# Patient Record
Sex: MALE | Race: WHITE | NOT HISPANIC OR LATINO | Employment: FULL TIME | URBAN - METROPOLITAN AREA
[De-identification: names, ages, dates, MRNs, and addresses within clinical notes are randomized per-mention and may not be internally consistent; named-entity substitution may affect disease eponyms.]

---

## 2019-10-29 ENCOUNTER — OFFICE VISIT (OUTPATIENT)
Dept: SLEEP MEDICINE | Facility: CLINIC | Age: 24
End: 2019-10-29

## 2019-10-29 VITALS
BODY MASS INDEX: 40.6 KG/M2 | SYSTOLIC BLOOD PRESSURE: 148 MMHG | OXYGEN SATURATION: 97 % | HEIGHT: 66 IN | HEART RATE: 80 BPM | DIASTOLIC BLOOD PRESSURE: 88 MMHG | WEIGHT: 252.6 LBS

## 2019-10-29 DIAGNOSIS — R06.83 SNORING: ICD-10-CM

## 2019-10-29 DIAGNOSIS — G47.30 OBSERVED SLEEP APNEA: Primary | ICD-10-CM

## 2019-10-29 DIAGNOSIS — F51.01 PRIMARY INSOMNIA: ICD-10-CM

## 2019-10-29 DIAGNOSIS — R56.9 SEIZURE (HCC): ICD-10-CM

## 2019-10-29 DIAGNOSIS — E66.01 CLASS 3 SEVERE OBESITY DUE TO EXCESS CALORIES WITHOUT SERIOUS COMORBIDITY WITH BODY MASS INDEX (BMI) OF 40.0 TO 44.9 IN ADULT (HCC): ICD-10-CM

## 2019-10-29 DIAGNOSIS — G47.10 HYPERSOMNIA: ICD-10-CM

## 2019-10-29 PROBLEM — E66.813 CLASS 3 SEVERE OBESITY DUE TO EXCESS CALORIES WITHOUT SERIOUS COMORBIDITY WITH BODY MASS INDEX (BMI) OF 40.0 TO 44.9 IN ADULT: Status: ACTIVE | Noted: 2019-10-29

## 2019-10-29 PROCEDURE — 99204 OFFICE O/P NEW MOD 45 MIN: CPT | Performed by: INTERNAL MEDICINE

## 2019-10-29 PROCEDURE — G0463 HOSPITAL OUTPT CLINIC VISIT: HCPCS

## 2019-10-29 RX ORDER — LEVETIRACETAM 500 MG/1
500 TABLET ORAL 2 TIMES DAILY
COMMUNITY

## 2019-10-29 RX ORDER — ZOLPIDEM TARTRATE 5 MG/1
5 TABLET ORAL TAKE AS DIRECTED
Qty: 2 TABLET | Refills: 0 | Status: SHIPPED | OUTPATIENT
Start: 2019-10-29

## 2019-10-29 RX ORDER — FEXOFENADINE HCL 180 MG/1
180 TABLET ORAL DAILY
COMMUNITY

## 2019-10-29 NOTE — PROGRESS NOTES
[facility]  [address], [place] [address]  Phone   Fax       Hakan Rankin  1995  24 y.o.  male      PCP:Alvin Salgado MD    Type of service: Initial New Patient Office Visit  Date of service: 10/29/2019    Chief Complaint   Patient presents with   • Fatigue   • Unable To Stay Asleep   • Witnessed Apnea   • Snoring   • Daytime Sleepiness   • Seizures       History of present illness;  Hakan Rankin is a new patient for me and was seen today for sleep related problems.  Patient has self-referred to the sleep center because his wife has told him that he he has significant amount snoring and he stops breathing.  He also is tired and has daytime excessive sleepiness.  He works as a  at [employer].  He says he is very pena very irritable.  He also gives history of having seizures for a long time and takes medications.  Sometimes when he wakes up he has difficult time going back to sleep.    Patient gives the following sleep history.  Sleep schedule:  Bedtime: Between 8 10:10 PM  Wake time: Between 6 and 7 AM  Normally takes about 20 minutes to fall asleep  Average hours of sleep 6-7  Number of naps per day 1  When patient wakes up still feels tired and not rested  Snoring yes  Witnessed apneas yes  Have you ever awakened gasping for breath, coughing, choking: Yes      Past Medical History:   Diagnosis Date   • Obesity    • Seasonal allergies    • Seizures (CMS/[facility])        Social history:  Shift work: No  Tobacco use: No  Alcohol use: Maybe once in 3 months  Caffeinated drinks: 1-2 coffee  Over-the-counter sleeping aid and medications: No  Narcotic medications: No    Family Hx  Family history of sleep apnea no  Family History   Problem Relation Age of Onset   • Seizures Sister        Medications: reviewed    Current Outpatient Medications:   •  fexofenadine (ALLEGRA) 180 MG tablet, Take 180 mg by mouth Daily., Disp: , Rfl:   •   "levETIRAcetam (KEPPRA) 500 MG tablet, Take 500 mg by mouth 2 (Two) Times a Day., Disp: , Rfl:   •  zolpidem (AMBIEN) 5 MG tablet, Take 1 tablet by mouth Take As Directed for 2 doses. Bring the medication to the sleep lab. DO NOT USE AT HOME, Disp: 2 tablet, Rfl: 0    Review of systems:  [place] Sleepiness Scale: Total score: 13   Positive for snoring, witnessed apneas, fatigue and daytime excessive sleepiness,   Negative for shortness of breath, cough, wheezing, chest pain, nausea and vomiting, palpitation, swelling of feet:    Morning headaches: Yes sometimes  Awaken with sore throat or dry mouth : Yes  Leg jerking at night: No  Crawly feeling/urge sensation to move in the legs: No  Teeth grinding: No  Sleepwalking, nightmares, muscle weakness with laughing or anger,sleep paralysis: No  Nasal Congestion: No  Nocturia (how many times/night): 1  Memory Problem: No  Weight change, no    Physical exam:  Vitals:    10/29/19 1300   BP: 148/88   Pulse: 80   SpO2: 97%   Weight: 115 kg (252 lb 9.6 oz)   Height: 167.6 cm (66\")    Body mass index is 40.77 kg/m². Neck Circumference: 17.5 inches  HEENT: Head is atraumatic, normocephalic  Eyes: pupils are round equal and reacting to light and accommodation, conjunctiva normal  Nose: no nasal septal defects or deviation and the nasal passages are clear, no nasal polyps,  Throat: tonsils are not enlarged, tongue normal size, oral airway Mallampati class 3  NECK: No lymphadenopathy, trachea is in the midline, thyroid not enlarged  RESPIRATORY SYSTEM: Breath sounds are equal on both sides, there are no wheezes or crackles  CARDIOVASULAR SYSTEM: Heart sounds are regular rhythm and mai rate, there are no murmurs or thrills  ABDOMEN: Soft, no hepatosplenomegaly, no evidence of ascites  EXTREMITES: No cyanosis, clubbing or edema   NEUROLOGICAL SYSTEM: Oriented x 3, no gross motor defects, gait normal      Assessment and plan:  · Sleep apnea unspecified, (G47.30) Patient's symptoms and " examination is consistent with sleep apnea.  I have talked to the patient about the signs and symptoms of sleep apnea and consequences of untreated sleep apnea. Discussed the sleep testing. I have placed a order in epic for a in lab Split night sleep test.  Patient also gives a history of insomnia have given him a prescription for zolpidem to be used for the sleep test.  In addition patient has a history of seizures which requires an lab monitoring.  · Obesity, patient's BMI is Body mass index is 40.77 kg/m².. I have discussed the relationship between weight and sleep apnea. Weight reduction is encouraged.  I will also discussed with the patient diet and exercise.  · Snoring secondary to sleep apnea  · Hypersomnia with [place] Sleepiness Scale of Total score: 13 due to sleep apnea.  · History of seizures who is on Keppra  · Insomnia, I have talked to the patient about sleep hygiene and maintaining a regular sleep and wake cycle.        Teagan Foster MD, [facility]  Sleep Medicine.(Board-certified)  [facility]  10/29/2019 ,

## 2019-11-03 ENCOUNTER — HOSPITAL ENCOUNTER (OUTPATIENT)
Dept: SLEEP MEDICINE | Facility: HOSPITAL | Age: 24
Discharge: HOME | End: 2019-11-03
Admitting: INTERNAL MEDICINE

## 2019-11-03 VITALS — HEIGHT: 66 IN | BODY MASS INDEX: 40.75 KG/M2 | WEIGHT: 253.53 LBS

## 2019-11-03 DIAGNOSIS — G47.30 OBSERVED SLEEP APNEA: ICD-10-CM

## 2019-11-03 DIAGNOSIS — F51.01 PRIMARY INSOMNIA: ICD-10-CM

## 2019-11-03 DIAGNOSIS — R56.9 SEIZURE (HCC): ICD-10-CM

## 2019-11-03 DIAGNOSIS — R06.83 SNORING: ICD-10-CM

## 2019-11-03 DIAGNOSIS — G47.10 HYPERSOMNIA: ICD-10-CM

## 2019-11-03 DIAGNOSIS — E66.01 CLASS 3 SEVERE OBESITY DUE TO EXCESS CALORIES WITHOUT SERIOUS COMORBIDITY WITH BODY MASS INDEX (BMI) OF 40.0 TO 44.9 IN ADULT (HCC): ICD-10-CM

## 2019-11-03 PROCEDURE — 95811 POLYSOM 6/>YRS CPAP 4/> PARM: CPT

## 2019-11-03 PROCEDURE — 95811 POLYSOM 6/>YRS CPAP 4/> PARM: CPT | Performed by: INTERNAL MEDICINE

## 2019-11-05 DIAGNOSIS — R06.83 SNORING: ICD-10-CM

## 2019-11-05 DIAGNOSIS — G47.33 OSA (OBSTRUCTIVE SLEEP APNEA): Primary | ICD-10-CM

## 2019-11-05 DIAGNOSIS — R56.9 SEIZURE (HCC): ICD-10-CM

## 2019-11-05 DIAGNOSIS — G47.10 HYPERSOMNIA: ICD-10-CM

## 2020-01-21 ENCOUNTER — OFFICE VISIT (OUTPATIENT)
Dept: SLEEP MEDICINE | Facility: CLINIC | Age: 25
End: 2020-01-21

## 2020-01-21 VITALS
DIASTOLIC BLOOD PRESSURE: 82 MMHG | OXYGEN SATURATION: 95 % | WEIGHT: 255.6 LBS | SYSTOLIC BLOOD PRESSURE: 149 MMHG | HEART RATE: 88 BPM | HEIGHT: 66 IN | BODY MASS INDEX: 41.08 KG/M2

## 2020-01-21 DIAGNOSIS — G47.33 OSA TREATED WITH BIPAP: Primary | ICD-10-CM

## 2020-01-21 DIAGNOSIS — E66.01 CLASS 3 SEVERE OBESITY DUE TO EXCESS CALORIES WITHOUT SERIOUS COMORBIDITY WITH BODY MASS INDEX (BMI) OF 40.0 TO 44.9 IN ADULT (HCC): ICD-10-CM

## 2020-01-21 PROBLEM — G47.10 HYPERSOMNIA: Status: RESOLVED | Noted: 2019-10-29 | Resolved: 2020-01-21

## 2020-01-21 PROBLEM — R06.83 SNORING: Status: RESOLVED | Noted: 2019-10-29 | Resolved: 2020-01-21

## 2020-01-21 PROCEDURE — 99213 OFFICE O/P EST LOW 20 MIN: CPT | Performed by: INTERNAL MEDICINE

## 2020-01-21 PROCEDURE — G0463 HOSPITAL OUTPT CLINIC VISIT: HCPCS

## 2020-01-21 NOTE — PROGRESS NOTES
[facility]  [address], [place] [address]  Phone   Fax       SLEEP CLINIC FOLLOW UP PROGRESS NOTE.    Hakan Rankin  1995  24 y.o.  male      PCP: Alvin Salgado MD      Date of visit: 1/21/2020    Chief Complaint   Patient presents with   • Sleep Apnea   • Follow-up       INTERM HISTORY:  This is a 24 y.o. years old patient who has a history of sleep apnea and is on BIPAP.  Patient reports good compliance with the device.  Patient is using the BiPAP on a regular basis and feels much better.  He works nights and he sleeps in the daytime.  He complains of dry mouth    Sleep schedule  Normally goes to bed at 9 AM  Wakes up at 5 p.m.  Feels refreshed after waking up: Yes      The Smart card downloaded on 1/21/2020 has been reviewed by me and shows the following..  Compliance; 98 %  > 4 hr use, 70 %  Average use of the device 4 hours and 40 minutes per night  Residual AHI: 0.9 /hr (normal less than 5)  Mask type: Full facemask  DME: [employer]      Past Medical History:   Diagnosis Date   • Obesity    • Seasonal allergies    • Seizures (CMS/HCC)        MEDICATIONS: reviewed by me    Current Outpatient Medications:   •  fexofenadine (ALLEGRA) 180 MG tablet, Take 180 mg by mouth Daily., Disp: , Rfl:   •  levETIRAcetam (KEPPRA) 500 MG tablet, Take 500 mg by mouth 2 (Two) Times a Day., Disp: , Rfl:   •  zolpidem (AMBIEN) 5 MG tablet, Take 1 tablet by mouth Take As Directed for 2 doses. Bring the medication to the sleep lab. DO NOT USE AT HOME, Disp: 2 tablet, Rfl: 0    No Known Allergies reviewed by me    SOCIAL, FAMILY HISTORY: Medical records are reviewed and noted by me.    REVIEW OF SYSTEMS:   [place] Sleepiness Scale :Total score: 3   Snoring: Resolved  Morning headache: No  Nasal congestion: No  Leg movements: No  Number of times you wake up once asleep: None  Heart burn no    PHYSICAL EXAMINATION:  Vitals:    01/21/20 1106   BP: 149/82   Pulse: 88   SpO2:  "95%   Weight: 116 kg (255 lb 9.6 oz)   Height: 167.6 cm (66\")    Body mass index is 41.25 kg/m².    HEENT: pupils are round equal and reacting to light and accommodation, nasal passage is clear, no nasal polyps, no lymphadenopathy, throat is clear, oral airway Mallampati class 3  RESPRATORY SYSTEM: Breath sounds are equal on both sides and are normal, no wheezes or crackles  CARDIOVASULAR SYSTEM: Heart rate is regular without murmur  ABDOMEN: Soft, no ascites, no hepatosplenomegaly.  EXTREMITIES: No cyanosis, clubbing or edema       ASSESSMENT AND PLAN:  · Obstructive sleep apnea, patient is using the BIPAP with good compliance for treatment of sleep apnea.  I have reviewed the smart card down load and discussed with the patient the download data and encouarged the patient to continue to use the BIPAP.  His symptoms have improved and his sleep apnea has improved.  The residual AHI is acceptable.  The device is benefiting the patient and the device is medically necessary. The patient is also instructed to get the BiPAP supplies from the [employer] and see me in 1 year for follow-up.  The prescription for supplies has been sent to the DME company.    · Obesity, patient's BMI is Body mass index is 41.25 kg/m²..  I have discussed weight reduction and the health benefits.  I have also discuss the relationship between the weight and sleep apnea and encouraged the patient to lose weight which is beneficial in treating sleep apnea. Discussed diet and exercise with the patient.  · Shiftwork syndrome          Teagan Foster MD, [facility]  Sleep Medicine.(Board-certified)  [facility]  Medical Director for [facility]   1/21/2020               "

## 2021-09-14 ENCOUNTER — TELEPHONE (OUTPATIENT)
Dept: SLEEP MEDICINE | Facility: CLINIC | Age: 26
End: 2021-09-14

## 2021-09-14 NOTE — TELEPHONE ENCOUNTER
"Patient called in regards to the [employer] Recall. I advised him to call [employer] [phone] to get registered for a replacement. I confirmed that he is not using an ozone . I told him \"for reasons of medical necessity, we cannot advise to discontinue the use of your machine\". Patient verbally understood.  "

## 2024-08-05 NOTE — PROGRESS NOTES
Subjective: History of Seizure Disorder and Obstructive Sleep Apnea    Patient ID: Hakan Rankin is a 29 y.o. male.    CHIEF COMPLAINT:Seizure and STUART treated with BiPAP    History of Present Illness  New patient referred by Dr. Salgado for seizure disorder and STUART treated with BiPAP.     Mr. Rankin is a 29-year-old  male who presented alone for an evaluation of epilepsy and to be followed for BiPAP with STUART.  The patient states that he started having seizures in .  He reports they are [place] arm and leg movement tonic-clonic movements.  He has been followed for several years by Dr. Martinez and has been on Keppra 500 twice daily with no breakthrough seizures.  He states he has not had any seizures for at least 2 years.  The patient states that he should not be on Keppra because it causes anger issues.  I discussed this thoroughly with him and we had decided on putting him on a different medication Briviact. I notified him that we will have to do a titration increasing the Briviact to the right dose before we can start decreasing the Keppra.  He agreed with this transition.  I notified the patient to be established with our office we needed to get an MRI of the brain and do a sleep deprived EEG.      Sleep Apnea   The patient has had testing at [facility] and was followed by Luis Ley MD  Last office note states that the patient had an abnormal test with a baseline AHI of 25/h and then he was set up and titrated to BiPAP on a [employer] air care machine.  At his last visit he was about 80% compliant.  STUART device compliance data  DME Company: [employer]  PAP mode BiPAP  PAP settin/10  Mask type: Nasal pillow  STUART greater than 4-hour use 80%  STUART nightly use 5  Residual AHI 0.3  [place] sleepiness score was an 8  *The patient forgot his smart card today and will drop it by next week so that we can get a download and contact him and make an addendum to this note.        Seizure disorder  Onset of  seizures:  2006  Triggers: not sure  Last: couple years  Duration:  not sure  Frequency:  rare  Timing (awake/asleep/both):  awake  Features at onset:  no warning  Unresponsiveness (none/partial/complete):  complete  Loss of awareness (none/partial/complete):  complete  Eyes (closed/staring/rolled back/left/right): roll back  head deviation (straight/left/right):  not that he knows of  Automatisms (none/lip smacking/fumbling):  not that he knows of  Stiffening/posturing (none/gen/left/right/upper/lower):  none  jerking (none/gen/left/right/upper/lower/asynchronous/myoclonic):  whole body  Tongue biting:  yes  Speech (retained/arrested):  none  Incontinence (none/bowel/bladder):  not that he knows  Postictal duration/symptoms:  groggy, confused  Injuries:  none  Status epilepticus (never/ER visit/admission/intubation:  no   Current seizure medications: Keppra 500 mg twice daily      EPWORTH SLEEPINESS SCALE  Sitting and reading  JS [place] Sleepiness: 3   WatchingTV  JS [place] Sleepiness: 3  and 1  Sitting, inactive, in a public place  JS [place] Sleepiness: 1  As a passenger in a car for 1 hour w/o a break  JS [place] Sleepiness: 1  Lying down to rest in the afternoon  JS [place] Sleepiness: 1  Sitting and talking to someone  JS [place] Sleepiness: 0  Sitting quietly after a lunch  JS [place] Sleepiness: 1  In a car, while stopped for traffic or a light  JS [place] Sleepiness: 0  Total 10         The following portions of the patient's history were reviewed and updated as appropriate: allergies, current medications, past family history, past medical history, past social history, past surgical history and problem list.      Family History   Problem Relation Age of Onset    Seizures Sister        Past Medical History:   Diagnosis Date    Obesity     Seasonal allergies     Seizures        Social History     Socioeconomic History    Marital status:    Tobacco Use    Smoking status: Never    Smokeless tobacco:  Never   Substance and Sexual Activity    Alcohol use: Yes    Drug use: No    Sexual activity: Defer         Current Outpatient Medications:     levETIRAcetam (KEPPRA) 500 MG tablet, Take 1 tablet by mouth 2 (Two) Times a Day., Disp: , Rfl:     triamterene-hydrochlorothiazide (MAXZIDE-25) 37.5-25 MG per tablet, Take 1 tablet by mouth Daily., Disp: , Rfl:     brivaracetam (BRIVIACT) 100 MG tablet, Take 1 tablet by mouth 2 (Two) Times a Day., Disp: 180 tablet, Rfl: 3    brivaracetam (BRIVIACT) 50 MG tablet, Take 1 tablet by mouth 2 (Two) Times a Day for 30 days., Disp: 60 tablet, Rfl: 0    Review of Systems     I have reviewed ROS completed by medical assistant.     Objective:    Neurologic Exam     Mental Status   Oriented to person, place, and time.   Oriented to person.   Oriented to place.   Oriented to time.   Attention: normal. Concentration: normal.   Speech: speech is normal   Level of consciousness: alert  Knowledge: good and consistent with education.   Normal comprehension.     Cranial Nerves   Cranial nerves II through XII intact.     CN II   Visual fields full to confrontation.   Visual acuity: normal  Right visual field deficit: none  Left visual field deficit: none     CN III, IV, VI   Pupils are equal, round, and reactive to light.  Extraocular motions are normal.   Right pupil: Shape: regular. Reactivity: brisk. Consensual response: intact. Accommodation: intact.   Left pupil: Shape: regular. Reactivity: brisk. Consensual response: intact. Accommodation: intact.   CN III: no CN III palsy  CN VI: no CN VI palsy  Nystagmus: none   Diplopia: none  Ophthalmoparesis: none  Upgaze: normal  Downgaze: normal  Conjugate gaze: present    CN V   Facial sensation intact.     CN VII   Facial expression full, symmetric.     CN VIII   CN VIII normal.     CN IX, X   CN IX normal.   CN X normal.   Palate: symmetric    CN XI   CN XI normal.   Right sternocleidomastoid strength: normal  Left sternocleidomastoid strength:  normal  Right trapezius strength: normal  Left trapezius strength: normal    CN XII   CN XII normal.   Tongue: not atrophic  Fasciculations: absent  Tongue deviation: none    Motor Exam   Muscle bulk: normal  Overall muscle tone: normal  Right arm tone: normal  Left arm tone: normal  Right arm pronator drift: absent  Left arm pronator drift: absent  Right leg tone: normal  Left leg tone: normal    Strength   Strength 5/5 throughout.   Right neck flexion: 5/5  Left neck flexion: 5/5  Right neck extension: 5/5  Left neck extension: 5/5  Right deltoid: 5/5  Left deltoid: 5/5  Right biceps: 5/5  Left biceps: 5/5  Right triceps: 5/5  Left triceps: 5/5  Right wrist flexion: 5/5  Left wrist flexion: 5/5  Right wrist extension: 5/5  Left wrist extension: 5/5  Right interossei: 5/5  Left interossei: 5/5  Right iliopsoas: 5/5  Left iliopsoas: 5/5  Right quadriceps: 5/5  Left quadriceps: 5/5  Right hamstrin/5  Left hamstrin/5  Right glutei: 5/5  Left glutei: 5/5  Right anterior tibial: 5/5  Left anterior tibial: 5/5  Right posterior tibial: 5/5  Left posterior tibial: 5/5  Right peroneal: 5/5  Left peroneal: 5/5  Right gastroc: 5/5  Left gastroc: 5/5    Sensory Exam   Light touch normal.   Vibration normal.   Pinprick normal.     Gait, Coordination, and Reflexes     Gait  Gait: normal    Coordination   Romberg: negative  Finger to nose coordination: normal  Heel to shin coordination: normal  Tandem walking coordination: normal    Tremor   Resting tremor: absent  Intention tremor: absent  Action tremor: absent    Reflexes   Right brachioradialis: 2+  Left brachioradialis: 2+  Right biceps: 2+  Left biceps: 2+  Right triceps: 2+  Left triceps: 2+  Right patellar: 2+  Left patellar: 2+  Right achilles: 2+  Left achilles: 2+  Right : 2+  Left : 2+  Right plantar: normal  Left plantar: normal  Right Gómez: absent  Left Gómez: absent  Right ankle clonus: absent  Left ankle clonus: absent  Right pendular knee  jerk: absent  Left pendular knee jerk: absent    Physical Exam  Vitals reviewed.   Constitutional:       Appearance: Normal appearance. He is well-developed, well-groomed and overweight.   HENT:      Head: Normocephalic and atraumatic.      Right Ear: Hearing normal.      Left Ear: Hearing normal.      Nose: Nose normal.      Mouth/Throat:      Lips: Pink.      Mouth: Mucous membranes are moist.   Eyes:      General: Lids are normal.      Extraocular Movements: Extraocular movements intact and EOM normal.      Right eye: Normal extraocular motion and no nystagmus.      Left eye: Normal extraocular motion and no nystagmus.      Pupils: Pupils are equal, round, and reactive to light.   Cardiovascular:      Rate and Rhythm: Normal rate.      Pulses:           Carotid pulses are 2+ on the right side and 2+ on the left side.     Heart sounds: Normal heart sounds, S1 normal and S2 normal.   Pulmonary:      Effort: Pulmonary effort is normal.      Breath sounds: Normal breath sounds and air entry.   Musculoskeletal:         General: Normal range of motion.      Cervical back: Full passive range of motion without pain and normal range of motion.      Comments: See Neuro list    Skin:     General: Skin is warm and dry.   Neurological:      Mental Status: He is alert and oriented to person, place, and time.      Cranial Nerves: Cranial nerves 2-12 are intact. No dysarthria or facial asymmetry.      Motor: Motor strength is normal.No weakness or tremor.      Coordination: Finger-Nose-Finger Test, Heel to Shin Test and Romberg Test normal.      Gait: Gait is intact. Gait and tandem walk normal.      Deep Tendon Reflexes:      Reflex Scores:       Tricep reflexes are 2+ on the right side and 2+ on the left side.       Bicep reflexes are 2+ on the right side and 2+ on the left side.       Brachioradialis reflexes are 2+ on the right side and 2+ on the left side.       Patellar reflexes are 2+ on the right side and 2+ on the left  side.       Achilles reflexes are 2+ on the right side and 2+ on the left side.  Psychiatric:         Attention and Perception: Attention and perception normal.         Mood and Affect: Mood normal.         Speech: Speech normal.         Behavior: Behavior is cooperative.         Thought Content: Thought content normal.     Assessment/Plan:  Keppra Weaning Schedule   First 30 days:   Keppra 500 bid, Briviact 50 mg twice daily  Next 2 weeks:  Keppra 500 twice daily, Briviact 100 twice daily  Next 1 week:  Keppra 500 mg once a day, Briviact 100 mg twice daily  Final week:  Stop all Keppra, continue Briviact 100 mg twice daily    The patient will drop by his smart card so we can get a BiPAP download.  He is to call the clinic with any questions or concerns or reach out through [employer].    Diagnoses and all orders for this visit:    1. Other generalized epilepsy, not intractable, without status epilepticus (Primary)  -     brivaracetam (BRIVIACT) 50 MG tablet; Take 1 tablet by mouth 2 (Two) Times a Day for 30 days.  Dispense: 60 tablet; Refill: 0  -     brivaracetam (BRIVIACT) 100 MG tablet; Take 1 tablet by mouth 2 (Two) Times a Day.  Dispense: 180 tablet; Refill: 3  -     MRI Brain With & Without Contrast; Future  -     EEG Continuous Monitoring With Video; Future    2. STUART treated with BiPAP    The patient was cautioned that obstructive sleep disordered breathing might be aggravated by certain conditions such as post anesthetic states or respiratory allergies.  Medications such as sedatives, hypnotics, muscle relaxants, opiate analgesics and or alcohol can aggravate the underlying obstructive sleep disordered breathing.If the patient is significantly drowsy or inattentive during the daytime, should be advised to avoid situations such as driving in which safety could be compromised by impairment of alertness.  Do not drive if drowsy. Mask, Tubing, and Filters per DME. Call with any questions or concerns.      Return  in about 6 months (around 2/7/2025), or Dr Seipel or me.    I spent 81 minutes caring for Hakan on this date of service. This time includes time spent by me in the following activities: preparing for the visit, reviewing tests, obtaining and/or reviewing a separately obtained history, performing a medically appropriate examination and/or evaluation, counseling and educating the patient/family/caregiver, ordering medications, tests, or procedures, and documenting information in the medical record.      This document has been electronically signed by Clara SEGOVIA on August 7, 2024 15:00 EDT

## 2024-08-07 ENCOUNTER — OFFICE VISIT (OUTPATIENT)
Dept: NEUROLOGY | Facility: CLINIC | Age: 29
End: 2024-08-07
Payer: MEDICAID

## 2024-08-07 VITALS
HEART RATE: 76 BPM | DIASTOLIC BLOOD PRESSURE: 71 MMHG | HEIGHT: 66 IN | WEIGHT: 255 LBS | BODY MASS INDEX: 40.98 KG/M2 | SYSTOLIC BLOOD PRESSURE: 120 MMHG

## 2024-08-07 DIAGNOSIS — G47.33 OSA TREATED WITH BIPAP: ICD-10-CM

## 2024-08-07 DIAGNOSIS — G40.409 OTHER GENERALIZED EPILEPSY, NOT INTRACTABLE, WITHOUT STATUS EPILEPTICUS: Primary | ICD-10-CM

## 2024-08-07 PROCEDURE — 99205 OFFICE O/P NEW HI 60 MIN: CPT | Performed by: NURSE PRACTITIONER

## 2024-08-07 PROCEDURE — 1159F MED LIST DOCD IN RCRD: CPT | Performed by: NURSE PRACTITIONER

## 2024-08-07 PROCEDURE — 1160F RVW MEDS BY RX/DR IN RCRD: CPT | Performed by: NURSE PRACTITIONER

## 2024-08-07 RX ORDER — TRIAMTERENE AND HYDROCHLOROTHIAZIDE 37.5; 25 MG/1; MG/1
1 TABLET ORAL DAILY
COMMUNITY

## 2024-08-28 ENCOUNTER — HOSPITAL ENCOUNTER (OUTPATIENT)
Dept: MRI IMAGING | Facility: HOSPITAL | Age: 29
Discharge: HOME | End: 2024-08-28
Admitting: NURSE PRACTITIONER
Payer: MEDICAID

## 2024-08-28 DIAGNOSIS — G40.409 OTHER GENERALIZED EPILEPSY, NOT INTRACTABLE, WITHOUT STATUS EPILEPTICUS: ICD-10-CM

## 2024-08-28 PROCEDURE — 70553 MRI BRAIN STEM W/O & W/DYE: CPT

## 2024-08-28 PROCEDURE — A9579 GAD-BASE MR CONTRAST NOS,1ML: HCPCS | Performed by: NURSE PRACTITIONER

## 2024-08-28 PROCEDURE — 25010000002 GADOTERIDOL PER 1 ML: Performed by: NURSE PRACTITIONER

## 2024-08-28 RX ADMIN — GADOTERIDOL 20 ML: 279.3 INJECTION, SOLUTION INTRAVENOUS at 08:32

## 2024-08-28 NOTE — PROGRESS NOTES
Please call the patient regarding his normal result.  Notify the patient that his MRI of the brain on 8/28/2024 was within normal limits.    MRI brain  IMPRESSION:  Impression:  Normal contrast-enhanced MRI of the brain, including dedicated evaluation of the mesial temporal lobe structures, without evidence of significant atrophy or other focal hippocampal asymmetry.  Electronically Signed: Nakul Sharma MD    8/28/2024 12:19 PM EDT    Workstation ID: [id number]

## 2024-09-04 ENCOUNTER — HOSPITAL ENCOUNTER (OUTPATIENT)
Dept: NEUROLOGY | Facility: HOSPITAL | Age: 29
Discharge: HOME | End: 2024-09-04
Payer: MEDICAID

## 2024-09-04 DIAGNOSIS — G40.409 OTHER GENERALIZED EPILEPSY, NOT INTRACTABLE, WITHOUT STATUS EPILEPTICUS: ICD-10-CM

## 2025-02-08 DIAGNOSIS — G40.409 OTHER GENERALIZED EPILEPSY, NOT INTRACTABLE, WITHOUT STATUS EPILEPTICUS: ICD-10-CM

## 2025-02-10 RX ORDER — BRIVARACETAM 100 MG/1
100 TABLET, FILM COATED ORAL 2 TIMES DAILY
Qty: 60 TABLET | Refills: 6 | Status: SHIPPED | OUTPATIENT
Start: 2025-02-10 | End: 2025-02-12

## 2025-02-11 ENCOUNTER — TELEPHONE (OUTPATIENT)
Dept: NEUROLOGY | Facility: CLINIC | Age: 30
End: 2025-02-11
Payer: COMMERCIAL

## 2025-02-11 NOTE — PROGRESS NOTES
Subjective: Epilepsy follow up    Patient ID: Hakan Rankin is a 29 y.o. male.    History of Present Illness Mr. Rankin is a 29-year-old  male who is following up today on epilepsy.  At his last visit he was weaned from Keppra to Briviact.  He is also on BiPAP for sleep apnea.  Mr. Rankin states that he has not had a seizure since estimated 2021.  He is doing very well on the Briviact at 100 twice a day.  He asked that I move that to a different pharmacy today so I will.  The patient is interested in having 3 EEGs in 1 year to see if he can wean off of seizure medications.  I notified him that I will start the process today and order the first EEG.  He was notified if there is a seizure focus on any of those that he will have to stay on medicine long-term and the patient agreed.  He was notified that his last 4-hour sleep deprived EEG was within normal limits.       Seizure Type: Undetermined    Onset: 2006  Frequency: Rare  Last Seizure: ?2022  Semiology change:  Medication: Briviact 100 mg bid  Any adverse effects of meds?  Failed medications?       Seizure disorder history   Onset of seizures:  2006  Triggers: not sure  Last: couple years  Duration:  not sure  Frequency:  rare  Timing (awake/asleep/both):  awake  Features at onset:  no warning  Unresponsiveness (none/partial/complete):  complete  Loss of awareness (none/partial/complete):  complete  Eyes (closed/staring/rolled back/left/right): roll back  head deviation (straight/left/right):  not that he knows of  Automatisms (none/lip smacking/fumbling):  not that he knows of  Stiffening/posturing (none/gen/left/right/upper/lower):  none  jerking (none/gen/left/right/upper/lower/asynchronous/myoclonic):  whole body  Tongue biting:  yes  Speech (retained/arrested):  none  Incontinence (none/bowel/bladder):  not that he knows  Postictal duration/symptoms:  groggy, confused  Injuries:  none  Status epilepticus (never/ER visit/admission/intubation:   no   Current seizure medications: Keppra 500 mg twice daily      EEG REPORT  DATE OF RECORDING: September 4, 2024  EEG number:   TOTAL RECORDING TIME 4 hours  IMPRESSION:   This is a normal 4-hour video sleep EEG recorded during the awake and drowsy state. The absence of epileptiform abnormalities however does not rule out the presence of a seizure disorder.  Electronically signed by:   Joseph Seipel, MD  September 5, 2024        MRI BRAIN W WO CONTRAST   Date of Exam: 8/28/2024 8:03 AM EDT   Indication: Epilepsy.   Comparison: None available.   Impression:  Normal contrast-enhanced MRI of the brain, including dedicated evaluation of the mesial temporal lobe structures, without evidence of significant atrophy or other focal hippocampal asymmetry.     Electronically Signed: Nakul Sharma MD    8/28/2024 12:19 PM EDT       The patient was cautioned that obstructive sleep disordered breathing might be aggravated by certain conditions such as post anesthetic states or respiratory allergies.  Medications such as sedatives, hypnotics, muscle relaxants, opiate analgesics and or alcohol can aggravate the underlying obstructive sleep disordered breathing.If the patient is significantly drowsy or inattentive during the daytime, should be advised to avoid situations such as driving in which safety could be compromised by impairment of alertness.  Do not drive if drowsy. Mask, Tubing, and Filters per DME. Call with any questions or concerns.           The following portions of the patient's history were reviewed and updated as appropriate: allergies, current medications, past family history, past medical history, past social history, past surgical history and problem list.    Family History   Problem Relation Age of Onset    Seizures Sister        Past Medical History:   Diagnosis Date    Obesity     Seasonal allergies     Seizures        Social History     Socioeconomic History    Marital status:    Tobacco Use     Smoking status: Never    Smokeless tobacco: Never   Substance and Sexual Activity    Alcohol use: Yes    Drug use: No    Sexual activity: Defer         Current Outpatient Medications:     brivaracetam (BRIVIACT) 100 MG tablet, Take 1 tablet by mouth 2 (Two) Times a Day for 180 days., Disp: 60 tablet, Rfl: 5    triamterene-hydrochlorothiazide (MAXZIDE-25) 37.5-25 MG per tablet, Take 1 tablet by mouth Daily., Disp: , Rfl:     Review of Systems   All other systems reviewed and are negative.         I have reviewed ROS completed by medical assistant.     Objective:      Neurological Exam  Mental Status  Awake and alert. Oriented only to person, place, time and situation. Speech is normal. Language is fluent with no aphasia. Attention and concentration are normal. Fund of knowledge is appropriate for level of education.    Cranial Nerves  CN II: Right visual acuity: Normal. Left visual acuity: Normal. Right normal visual field. Left normal visual field.  CN III, IV, VI: Extraocular movements intact bilaterally. No nystagmus. Normal saccades. Normal smooth pursuit.   Right pupil: 2 mm.   Left pupil: 2 mm.  CN VII:  Right: There is no facial weakness.  Left: There is no facial weakness.    Motor  Normal muscle bulk throughout. No fasciculations present.    Sensory  Light touch is normal in upper and lower extremities.     Gait  Casual gait is normal including stance, stride, and arm swing.       Assessment/Plan:  The patient will be scheduled for 3 EEGs.  If all 3 are normal we could slowly wean seizure meds.  If he has any seizure activity on any of these EEGs he will have to remain on seizure drugs indefinitely.  He was encouraged to continue to follow seizure precautions:  The patient was told to observe all seizure precautions, including, but not limited to:   If a Seizures occurs: No driving until seizure free for more than 3 months  Avoid all high-risk activity, examples: Take showers instead of baths, avoid  swimming without observation, Avoid open heat sources, Avoid working at heights, and Avoid engaging in all potentially hazardous activities , Avoid use of fire arms/hunting,    Patient expressed clear understanding.     Extensive clinic time counseling patient and family on the following topics: common seizure triggers (sleep deprivation, medication non-compliance, stress, fever, and drugs/alcohol), personal risk for recurrence, epilepsy specific safety issues, and seizure first aid. If events last longer than 5 minutes, if the patient has multiple events without return to baseline, or if the patient has a serious injury from seizure, I advised them to call 911 immediately.         Diagnoses and all orders for this visit:    1. Other generalized epilepsy, not intractable, without status epilepticus (Primary)  -     EEG Continuous Monitoring With Video; Future  -     brivaracetam (BRIVIACT) 100 MG tablet; Take 1 tablet by mouth 2 (Two) Times a Day for 180 days.  Dispense: 60 tablet; Refill: 5          Return in about 1 year (around 2/12/2026) for Clara Llamas .     I spent 31 minutes caring for Hakan on this date of service. This time includes time spent by me in the following activities: preparing for the visit, reviewing tests, obtaining and/or reviewing a separately obtained history, performing a medically appropriate examination and/or evaluation, counseling and educating the patient/family/caregiver, ordering medications, tests, or procedures, documenting information in the medical record, and independently interpreting results and communicating that information with the patient/family/caregiver.      This document has been electronically signed by JULIETTE Rebolledo on February 12, 2025 08:25 EST

## 2025-02-11 NOTE — TELEPHONE ENCOUNTER
Patient left msg with answering service regarding not being able to get his medication.   I called and spoke with [facility], they have RX and no insurance issues they just did not have it in stock yesterday, but should have it tomorrow.   I called and left message for patient regarding this as well.  HUB ok to relay

## 2025-02-12 ENCOUNTER — OFFICE VISIT (OUTPATIENT)
Dept: NEUROLOGY | Facility: CLINIC | Age: 30
End: 2025-02-12
Payer: COMMERCIAL

## 2025-02-12 VITALS
HEIGHT: 66 IN | DIASTOLIC BLOOD PRESSURE: 79 MMHG | WEIGHT: 275 LBS | SYSTOLIC BLOOD PRESSURE: 132 MMHG | HEART RATE: 86 BPM | BODY MASS INDEX: 44.2 KG/M2

## 2025-02-12 DIAGNOSIS — G40.409 OTHER GENERALIZED EPILEPSY, NOT INTRACTABLE, WITHOUT STATUS EPILEPTICUS: Primary | ICD-10-CM

## 2025-02-12 PROCEDURE — 99214 OFFICE O/P EST MOD 30 MIN: CPT | Performed by: NURSE PRACTITIONER

## 2025-08-10 DIAGNOSIS — G40.409 OTHER GENERALIZED EPILEPSY, NOT INTRACTABLE, WITHOUT STATUS EPILEPTICUS: ICD-10-CM

## 2025-08-11 RX ORDER — BRIVARACETAM 100 MG/1
100 TABLET, FILM COATED ORAL 2 TIMES DAILY
Qty: 60 TABLET | Refills: 12 | Status: SHIPPED | OUTPATIENT
Start: 2025-08-11